# Patient Record
Sex: FEMALE | Race: OTHER | HISPANIC OR LATINO | URBAN - METROPOLITAN AREA
[De-identification: names, ages, dates, MRNs, and addresses within clinical notes are randomized per-mention and may not be internally consistent; named-entity substitution may affect disease eponyms.]

---

## 2020-01-12 ENCOUNTER — EMERGENCY (EMERGENCY)
Facility: HOSPITAL | Age: 55
LOS: 1 days | Discharge: HOME | End: 2020-01-12
Attending: EMERGENCY MEDICINE
Payer: COMMERCIAL

## 2020-01-12 VITALS
TEMPERATURE: 98 F | SYSTOLIC BLOOD PRESSURE: 160 MMHG | WEIGHT: 162.04 LBS | OXYGEN SATURATION: 100 % | HEART RATE: 62 BPM | DIASTOLIC BLOOD PRESSURE: 78 MMHG | RESPIRATION RATE: 16 BRPM

## 2020-01-12 PROCEDURE — 99283 EMERGENCY DEPT VISIT LOW MDM: CPT

## 2020-01-12 RX ORDER — LOSARTAN POTASSIUM 100 MG/1
0 TABLET, FILM COATED ORAL
Qty: 0 | Refills: 0 | DISCHARGE

## 2020-01-13 PROCEDURE — 99283 EMERGENCY DEPT VISIT LOW MDM: CPT

## 2020-01-13 RX ORDER — IBUPROFEN 200 MG
600 TABLET ORAL ONCE
Refills: 0 | Status: COMPLETED | OUTPATIENT
Start: 2020-01-13 | End: 2020-01-13

## 2020-01-13 RX ORDER — METHOCARBAMOL 500 MG/1
1500 TABLET, FILM COATED ORAL ONCE
Refills: 0 | Status: COMPLETED | OUTPATIENT
Start: 2020-01-13 | End: 2020-01-13

## 2020-01-13 RX ORDER — METHOCARBAMOL 500 MG/1
2 TABLET, FILM COATED ORAL
Qty: 40 | Refills: 0
Start: 2020-01-13 | End: 2020-01-17

## 2020-01-13 RX ORDER — IBUPROFEN 200 MG
1 TABLET ORAL
Qty: 20 | Refills: 0
Start: 2020-01-13

## 2020-01-13 RX ADMIN — Medication 600 MILLIGRAM(S): at 00:21

## 2020-01-13 RX ADMIN — METHOCARBAMOL 1500 MILLIGRAM(S): 500 TABLET, FILM COATED ORAL at 00:21

## 2020-01-13 NOTE — ED PROVIDER NOTE - OBJECTIVE STATEMENT
Chief complaint of lower back pain x 2 days after lifting heavy bags. No urinary or bowel incontinence or retention. No motor weakness.

## 2020-01-13 NOTE — ED PROVIDER NOTE - CLINICAL SUMMARY MEDICAL DECISION MAKING FREE TEXT BOX
IBU, Robaxin, warm compresses. Pt has no focal deficits. IBU, Robaxin, warm compresses. Pt has no focal deficits.  1:30a- Pt feels better. Pt is well appearing, has no new complaints and able to walk with normal gait. Pt is stable for discharge and follow up with medical doctor. Pt educated on care and need for follow up. Discussed anticipatory guidance and return precautions. Questions answered. I had a detailed discussion with the patient and/or guardian regarding the historical points, exam findings, and any diagnostic results supporting the discharge diagnosis.

## 2020-01-13 NOTE — ED PROVIDER NOTE - CHPI ED SYMPTOMS NEG
no anorexia/no constipation/no bladder dysfunction/no difficulty bearing weight/no motor function loss/no numbness/no bowel dysfunction/no fatigue/no neck tenderness/no tingling

## 2020-01-13 NOTE — ED PROVIDER NOTE - NSFOLLOWUPINSTRUCTIONS_ED_ALL_ED_FT
Return if you have pain, weakness, numbness any concerns. do not lift heavy weights.   See your doctor as soon as possible (within 1-2 days).   If you need further assistance for appointments you can contact the [facility] Care Coordinator at [phone] or the [facility] Patient Access Services helpline at [phone] to find names/contact #s for a practitioner to follow up with.  Bring your discharge papers / test results with you to any follow up appointments.   In addition our outpatient [facility] is located at [address] [place] [address], tel: [phone].

## 2020-01-13 NOTE — ED PROVIDER NOTE - NSFOLLOWUPCLINICS_GEN_ALL_ED_FT
[facility]  Multi Specialty Office  [address] [facility] - [address] [place] [address]  Phone: [phone]  Fax: [phone]  Follow Up Time:

## 2020-01-13 NOTE — ED PROVIDER NOTE - PATIENT PORTAL LINK FT
You can access the FollowMyHealth Patient Portal offered by [facility] by registering at the following website: [email or web address]. By joining [employer]’s FollowMyHealth portal, you will also be able to view your health information using other applications (apps) compatible with our system.

## 2020-01-13 NOTE — ED PROVIDER NOTE - PHYSICAL EXAMINATION
No cervical, thoracic or lumbosacral midline bony deformities,  +rotation and flexion-extension of neck and truncal area intact.   Back: B/L lumbar paraspinal tenderness and tenseness.   No saddle anesthesia, B/L knee, pedal and EHL flex and ext intact, normal gait, no foot drop. Truncal flexion and extension intact.  No abdominal pain, no pulsatile abdominal mass, b/l femoral pulses intact

## 2021-03-03 ENCOUNTER — EMERGENCY (EMERGENCY)
Facility: HOSPITAL | Age: 56
LOS: 1 days | Discharge: HOME | End: 2021-03-03
Attending: EMERGENCY MEDICINE
Payer: COMMERCIAL

## 2021-03-03 VITALS
TEMPERATURE: 98 F | HEIGHT: 60 IN | OXYGEN SATURATION: 100 % | HEART RATE: 65 BPM | SYSTOLIC BLOOD PRESSURE: 138 MMHG | WEIGHT: 162.92 LBS | RESPIRATION RATE: 18 BRPM | DIASTOLIC BLOOD PRESSURE: 76 MMHG

## 2021-03-03 VITALS
RESPIRATION RATE: 18 BRPM | TEMPERATURE: 98 F | SYSTOLIC BLOOD PRESSURE: 128 MMHG | HEART RATE: 69 BPM | DIASTOLIC BLOOD PRESSURE: 72 MMHG | OXYGEN SATURATION: 100 %

## 2021-03-03 PROBLEM — Z86.79 PERSONAL HISTORY OF OTHER DISEASES OF THE CIRCULATORY SYSTEM: Chronic | Status: ACTIVE | Noted: 2020-01-12

## 2021-03-03 PROCEDURE — 99284 EMERGENCY DEPT VISIT MOD MDM: CPT

## 2021-03-03 PROCEDURE — 96372 THER/PROPH/DIAG INJ SC/IM: CPT

## 2021-03-03 PROCEDURE — 99283 EMERGENCY DEPT VISIT LOW MDM: CPT | Mod: 25

## 2021-03-03 RX ORDER — ACETAMINOPHEN 500 MG
975 TABLET ORAL ONCE
Refills: 0 | Status: COMPLETED | OUTPATIENT
Start: 2021-03-03 | End: 2021-03-03

## 2021-03-03 RX ORDER — DIAZEPAM 5 MG
5 TABLET ORAL ONCE
Refills: 0 | Status: DISCONTINUED | OUTPATIENT
Start: 2021-03-03 | End: 2021-03-03

## 2021-03-03 RX ORDER — DIAZEPAM 5 MG
1 TABLET ORAL
Qty: 8 | Refills: 0
Start: 2021-03-03 | End: 2021-03-05

## 2021-03-03 RX ORDER — KETOROLAC TROMETHAMINE 30 MG/ML
30 SYRINGE (ML) INJECTION ONCE
Refills: 0 | Status: DISCONTINUED | OUTPATIENT
Start: 2021-03-03 | End: 2021-03-03

## 2021-03-03 RX ORDER — LIDOCAINE 4 G/100G
1 CREAM TOPICAL ONCE
Refills: 0 | Status: COMPLETED | OUTPATIENT
Start: 2021-03-03 | End: 2021-03-03

## 2021-03-03 RX ADMIN — LIDOCAINE 1 PATCH: 4 CREAM TOPICAL at 18:08

## 2021-03-03 RX ADMIN — Medication 975 MILLIGRAM(S): at 19:07

## 2021-03-03 RX ADMIN — Medication 975 MILLIGRAM(S): at 18:07

## 2021-03-03 RX ADMIN — Medication 30 MILLIGRAM(S): at 18:00

## 2021-03-03 RX ADMIN — LIDOCAINE 1 PATCH: 4 CREAM TOPICAL at 19:12

## 2021-03-03 RX ADMIN — Medication 30 MILLIGRAM(S): at 17:00

## 2021-03-03 RX ADMIN — Medication 5 MILLIGRAM(S): at 17:00

## 2021-03-03 NOTE — ED PROVIDER NOTE - PROGRESS NOTE DETAILS
Feeling better. Based on history, physical exam, and information available at this time, the overall presentation does not indicate dissection or lytic lesions or cord compress and I do not see an indication that would warrant an emergent CT scan or surgical consultation. At this time, the evidence for any other entities in the differential is insufficient to warrant any further testing or ED observation. This was explained to the patient (and parent if patient is a minor). The patient was advised that persistent or worsening symptoms require further evaluation. Pt is well appearing walking with steady gait, stable for discharge and follow up without fail with medical doctor. I had a detailed discussion with the patient and/or guardian regarding the historical points, exam findings, and any diagnostic results supporting the discharge diagnosis. Pt educated on care and need for follow up. Strict return instructions and red flag signs and symptoms discussed with patient. Questions answered. Pt shows understanding of discharge information and agrees to follow.

## 2021-03-03 NOTE — ED ADULT TRIAGE NOTE - DIRECT TO ROOM CARE INITIATED:
Massage Therapy Progress Note      Length of treatment: 60-minute**Client's 6th massage is free today after 5 previous 60 minute sessions**    Authorization: Patient request    Reviewed contraindications/current health status with Patient    No Contraindications to massage therapy exist    Subjective:  Patient complains of:  Tightness in the head, neck, and shoulders greatest in the Lt. Shoulder. Client's Lt. Shoulder is paricularly sore from kayaking this past weekend. Client is here mostly to relax    Pain report prior to treatment:  Pain relief not a goal of massage    Type of treatment requested: Comfort massage and Therapeutic massage    Specific requests:  Full body     Objective Observations:  Hypertonicity noted in:  Upper trapezius Bilateral, teres minor Left, Gluteus medius bilateral, quad group bilateral., gastrocnemius bilateral, and erectors bilateral    Hypotonicity/Ischemia noted in: None noted    Decreased ROM noted in No areas    Additional observations:  None    Assessment:  Patient received Neuromuscular therapy, Cross-fiber friction, Longitudinal friction, Trigger point therapy, Swedish techniques and Myofascial release to affected tissues.    Additional treatment provided: Ice-cup massage to heel    Response to treatment: Hypertonicity in  noted tissues decreased, Patient's breathing slowed and Patient relaxed easily    Pain report after treatment:N/A    Education/Resources: None provided this visit    Plan/Recommendations:  Increase water consumption  Massage therapy as desired  Session concluded   03-Mar-2021 15:50

## 2021-03-03 NOTE — ED PROVIDER NOTE - PATIENT PORTAL LINK FT
You can access the FollowMyHealth Patient Portal offered by [facility] by registering at the following website: [email or web address]. By joining [employer]’s FollowMyHealth portal, you will also be able to view your health information using other applications (apps) compatible with our system.

## 2021-03-03 NOTE — ED ADULT NURSE NOTE - DRUG PRE-SCREENING (DAST -1)
Statement Selected Total Volume Injected (Ccs- Only Use Numbers And Decimals): 0.7 X Size Of Lesion In Cm (Optional): 0 Include Z78.9 (Other Specified Conditions Influencing Health Status) As An Associated Diagnosis?: No Concentration Of Solution Injected (Mg/Ml): 5.0 Consent: The risks of atrophy were reviewed with the patient. Kenalog Preparation: Kenalog Medical Necessity Clause: This procedure was medically necessary because the lesions that were treated were: Treatment Number (Optional): 1 Detail Level: Simple

## 2021-03-03 NOTE — ED PROVIDER NOTE - OBJECTIVE STATEMENT
Vero : [id number]  Mary Reyes  56 y/o F pt with a significant PMHx of HTN and no significant PSHx presents to the ED with c/o back pain x 6d. Pt states 6d ago, pt was lifting heavy and started to feel left lower back pain. Pt describes the pain as sharp and lower back pain is non-radiating.  Pt states that most times at rest, pt has no pain, but has pain with movement. Pt reports taking Robaxin and Tylenol with no relief. Pt denies any hx of back injury, cancer,  fever, chills, saddle anaesthesia, or any other complains.  cipther disfunction, and reassess. Vero : [id number] (Mayrelis)  56 y/o F pt with a significant PMHx of HTN and no significant PSHx presents to the ED with c/o back pain x 6d. Pt states 6d ago, pt was lifting heavy and started to feel generalized lower back pain. Pt describes the pain as sharp and lower back pain is non-radiating.  Pt states that most times at rest, pt has no pain, but has pain with movement. Pt reports taking Robaxin and Tylenol with no relief. Pt denies any hx of back injury, cancer, fever, chills, saddle anaesthesia, sphincter dysfunction, history of steroid use, or any other complains.

## 2021-03-03 NOTE — ED PROVIDER NOTE - CLINICAL SUMMARY MEDICAL DECISION MAKING FREE TEXT BOX
55 year-old female, presents with cc bilateral lower back pain s/p lifting heavy 6 days ago. Distribution of pain non-radicular, no focal neuro deficits, no systemic symptoms. Will do pain meds, re-assess.

## 2021-03-03 NOTE — ED PROVIDER NOTE - PHYSICAL EXAMINATION
Back is symmetrical, scapulae are symmetric. Neck has full range of motion. S1-S2, paraspinal tenderness, no deformity or step-offs. All limbs equally strong 5+ bilaterally. Strong ankle dorsiflexion and plantar flexion against resistance bilaterally. Strong flexion/extension of big toe bilaterally. Pt is able to walk in straight line with steady gait. No recent weight loss or night sweats. No saddle anesthesia, no bowel/bladder incontinence or retention, no lower extremity weakness.

## 2021-03-03 NOTE — ED PROVIDER NOTE - NSFOLLOWUPINSTRUCTIONS_ED_ALL_ED_FT
Shawn un seguimiento con el médico de atención primaria en 2-3 días.  Puede seguir tomando ibuprofeno (para el dolor, puede nolan 600 mg de ibuprofeno de venta deedee por vía oral cada 6 horas según sea necesario para el dolor. [place] el medicamento con alimentos).  **DETENGA el metocarbamol. En snyder lugar, tome el nuevo medicamento recetado. El medicamento puede causarle somnolencia.  Compresa tibia en la kacy lumbar 3-4 veces al día.  Si experimenta algún síntoma nuevo o que empeora o si está preocupado, siempre puede regresar a la emergencia para elma reevaluación.  Si le dieron alguna receta iza la visita de roxy rodriguezela según lo prescrito. Si tiene alguna pregunta, puede preguntarle al farmacéutico.    * * *    Follow up with the primary care doctor in 2-3 days.  You can continue taking the Ibuprofen ( For pain you can take over the counter Ibuprofen 600 mg orally every 6 hours as needed for pain. Take medication with food. )  STOP the Methocarbamol. Instead take the new prescribed medication. The medication can make you drowsy.  Warm compress to the lower back 3-4 times per day.   If you experience any new or worsening symptoms or if you are concerned you can always come back to the emergency for a re-evaluation.  If there were any prescriptions given to you during the visit today take them as prescribed. If you have any questions you can ask the pharmacist.

## 2024-10-30 ENCOUNTER — EMERGENCY (EMERGENCY)
Facility: HOSPITAL | Age: 59
LOS: 1 days | Discharge: HOME | End: 2024-10-30
Attending: EMERGENCY MEDICINE
Payer: COMMERCIAL

## 2024-10-30 VITALS
SYSTOLIC BLOOD PRESSURE: 179 MMHG | RESPIRATION RATE: 18 BRPM | HEART RATE: 77 BPM | TEMPERATURE: 99 F | HEIGHT: 64 IN | OXYGEN SATURATION: 100 % | DIASTOLIC BLOOD PRESSURE: 83 MMHG

## 2024-10-30 PROCEDURE — 96374 THER/PROPH/DIAG INJ IV PUSH: CPT

## 2024-10-30 PROCEDURE — 72131 CT LUMBAR SPINE W/O DYE: CPT | Mod: 26,MC

## 2024-10-30 PROCEDURE — 99284 EMERGENCY DEPT VISIT MOD MDM: CPT

## 2024-10-30 PROCEDURE — 72192 CT PELVIS W/O DYE: CPT | Mod: MC

## 2024-10-30 PROCEDURE — 96372 THER/PROPH/DIAG INJ SC/IM: CPT | Mod: XU

## 2024-10-30 PROCEDURE — 72131 CT LUMBAR SPINE W/O DYE: CPT | Mod: MC

## 2024-10-30 PROCEDURE — 72192 CT PELVIS W/O DYE: CPT | Mod: 26,MC

## 2024-10-30 PROCEDURE — 99284 EMERGENCY DEPT VISIT MOD MDM: CPT | Mod: 25

## 2024-10-30 RX ORDER — KETOROLAC TROMETHAMINE 10 MG/1
15 TABLET, FILM COATED ORAL ONCE
Refills: 0 | Status: DISCONTINUED | OUTPATIENT
Start: 2024-10-30 | End: 2024-10-30

## 2024-10-30 RX ORDER — LIDOCAINE 50 MG/G
1 CREAM TOPICAL
Qty: 1 | Refills: 0
Start: 2024-10-30 | End: 2024-11-03

## 2024-10-30 RX ORDER — DIAZEPAM 10 MG/1
5 TABLET ORAL ONCE
Refills: 0 | Status: DISCONTINUED | OUTPATIENT
Start: 2024-10-30 | End: 2024-10-30

## 2024-10-30 RX ORDER — OXYCODONE AND ACETAMINOPHEN 5; 325 MG/1; MG/1
1 TABLET ORAL ONCE
Refills: 0 | Status: DISCONTINUED | OUTPATIENT
Start: 2024-10-30 | End: 2024-10-30

## 2024-10-30 RX ORDER — ACETAMINOPHEN 325 MG
2 TABLET ORAL
Qty: 40 | Refills: 0
Start: 2024-10-30 | End: 2024-11-03

## 2024-10-30 RX ORDER — LIDOCAINE 50 MG/G
1 CREAM TOPICAL ONCE
Refills: 0 | Status: COMPLETED | OUTPATIENT
Start: 2024-10-30 | End: 2024-10-30

## 2024-10-30 RX ADMIN — LIDOCAINE 1 PATCH: 50 CREAM TOPICAL at 03:20

## 2024-10-30 RX ADMIN — KETOROLAC TROMETHAMINE 15 MILLIGRAM(S): 10 TABLET, FILM COATED ORAL at 03:20

## 2024-10-30 RX ADMIN — DIAZEPAM 5 MILLIGRAM(S): 10 TABLET ORAL at 03:20

## 2024-10-30 RX ADMIN — Medication 6 MILLIGRAM(S): at 03:20

## 2024-10-30 NOTE — ED PROVIDER NOTE - OBJECTIVE STATEMENT
59-year-old female with history of herniated disc presents with back pain.  Patient states her pain is in her gluteus on her upper thigh which is different she has never had pain there before she denies incontinence weakness numbness paresthesias.  And taking NSAIDs with no relief.

## 2024-10-30 NOTE — ED PROVIDER NOTE - CLINICAL SUMMARY MEDICAL DECISION MAKING FREE TEXT BOX
Patient with back pain presenting differently and given her age will CT to rule out any acute pathology

## 2024-10-30 NOTE — ED PROVIDER NOTE - PATIENT PORTAL LINK FT
You can access the FollowMyHealth Patient Portal offered by [facility] by registering at the following website: [email or web address]. By joining [employer]’s FollowMyHealth portal, you will also be able to view your health information using other applications (apps) compatible with our system.

## 2024-10-30 NOTE — ED PROVIDER NOTE - NSFOLLOWUPINSTRUCTIONS_ED_ALL_ED_FT
Joanna por elegir el [facility] para atenderlo iza snyder momento de necesidad.  Estamos aquí para usted las 24 horas del día, los 7 días de la semana, todos los días del año.      Espero que haya sentido que:    *Lo traté con cortesía y respeto,    *Lo escuché con atención y    * Le expliqué todo de elma manera fácil de entender.    Si no se cumplió alguna de estas expectativas, infórmeme a mí o a snyder enfermera.      Algunos de los medicamentos recetados son necesarios para snyder diagnóstico y deben comenzar a tomarse mañana; otros son para aliviar los síntomas según sea necesario. Asegúrese de comprender las instrucciones antes de irse.    Si usted o un miembro de snyder yelitza no pueden ir a elma farmacia mañana, podemos enviar maximus recetas a [facility] en [address] [facility], que está a lema distancia caminable y está abierto las 24 horas del día, los 7 días de la semana.    Si los costos de los medicamentos son elma preocupación, visite [email or web address] para obtener un cupón.    Por favor, regrese a la vanna de emergencias:    * Mañana si se siente peor,    * En 48 horas si maximus síntomas no cambian, o    * Inmediatamente si siente dolor en el pecho o dificultad para respirar.      Si usted o el miembro de snyder yelitza que lo acompaña necesita elma nota de excusa para ir al trabajo, no dude en pedirla.     Mejórese pronto, Dra. Tammy Duncan MD      Snyder diagnóstico aquí es:  DOLOR DE ESPALDA     Snyder plan de tratamiento es:       Manejo del dolor Según sea necesario:     -> Ibuprofeno/Motrin/Advil 600 mg cada 6 horas SEGÚN SEA NECESARIO SI TIENE DOLOR.   -> Si el dolor continúa a pesar del ibuprofeno, también puede nolan Acetaminofeno/Tylenol 650 mg cada 6 horas.   Estos dos tipos de medicamentos no interactúan, por lo que puede tomarlos juntos.    --> Lidocaine Patch por 12 horas cada cliff  --> Metocarbamol 500 mg cada 8 horas por musculos    Cuidados paliativos:   COMPRA THERMACARE PATCHES - Calor esta lashell por dolor

## 2024-10-30 NOTE — ED PROVIDER NOTE - PHYSICAL EXAMINATION
General: Well appearing,mild distress, appears stated age  HEENT: normocephalic, atraumatic   Respiratory: normal work of breathing  MSK: no swelling or tenderness of lower extremities, moving all extremities spontaneously   Skin: warm, dry  Neuro: A&Ox3, cranial nerves II-XII intact, 5/5 strength in all extremities, no sensory deficits, normal gait   Psych: appropriate affect

## 2025-06-17 NOTE — ED ADULT TRIAGE NOTE - TEMPERATURE IN FAHRENHEIT (DEGREES F)
Chief Complaint   Patient presents with    Ear Problem     History of Present Illness   Tracy Kern is a 58 year old female who presents to me today for ear evaluation. Referred by Dr.Christine Riojas for concerns of bilateral SNHL.     She tells me that she was seen in  and treated for an ear infection in May 2025. Treated with Amoxicillin, which did help. She was experiencing draining and seen again. She was placed on Prednisone, which did not work. She feels like there is something bubbling in her ear.     The patient reports an ear infection since May 10, 2025.   The patient has noticed increased difficulty hearing certain sounds and difficulty in understanding others, especially in noisy or crowded situations.  There is no history of recent head trauma, or chronic ear disease or ear surgery.  The patient reports no exposure to recreational, , and work-related noise exposure.  No family history of hearing loss at a young age. The patient denies vertigo, otorrhea, otalgia.     Feels like something is stuck in her throat.     Past Medical History  Patient Active Problem List   Diagnosis    Morbid obesity (H)    Other malaise and fatigue    Nonspecific reaction to tuberculin skin test without active tuberculosis    Right anterior shoulder pain    Rotator cuff syndrome, right     Current Medications     Current Outpatient Medications:     Acetaminophen (TYLENOL ARTHRITIS PAIN PO), , Disp: , Rfl:     IBUPROFEN PO, , Disp: , Rfl:     omeprazole (PRILOSEC) 20 MG DR capsule, Take 1 capsule (20 mg) by mouth daily., Disp: 90 capsule, Rfl: 0    predniSONE (DELTASONE) 20 MG tablet, Take 3 tabs by mouth daily for ten days then taper to 2 tabs daily for 5 days. ., Disp: 40 tablet, Rfl: 0    Allergies  Allergies   Allergen Reactions    Chloroquine Itching    Levofloxacin Itching       Social History   Social History     Socioeconomic History    Marital status:    Tobacco Use    Smoking status: Never     Passive  exposure: Past    Smokeless tobacco: Never   Vaping Use    Vaping status: Never Used   Substance and Sexual Activity    Alcohol use: No    Drug use: No    Sexual activity: Yes     Partners: Male     Social Drivers of Health     Financial Resource Strain: Low Risk  (5/23/2025)    Financial Resource Strain     Within the past 12 months, have you or your family members you live with been unable to get utilities (heat, electricity) when it was really needed?: No   Food Insecurity: Low Risk  (5/23/2025)    Food Insecurity     Within the past 12 months, did you worry that your food would run out before you got money to buy more?: No     Within the past 12 months, did the food you bought just not last and you didn t have money to get more?: No   Transportation Needs: Low Risk  (5/23/2025)    Transportation Needs     Within the past 12 months, has lack of transportation kept you from medical appointments, getting your medicines, non-medical meetings or appointments, work, or from getting things that you need?: No   Physical Activity: Insufficiently Active (5/23/2025)    Exercise Vital Sign     Days of Exercise per Week: 2 days     Minutes of Exercise per Session: 30 min   Stress: Stress Concern Present (5/23/2025)    [employer] - Occupational Stress Questionnaire     Feeling of Stress : To some extent   Social Connections: Unknown (5/23/2025)    Social Connection and Isolation Panel [NHANES]     Frequency of Social Gatherings with Friends and Family: Patient declined   Interpersonal Safety: Low Risk  (5/23/2025)    Interpersonal Safety     Do you feel physically and emotionally safe where you currently live?: Yes     Within the past 12 months, have you been hit, slapped, kicked or otherwise physically hurt by someone?: No     Within the past 12 months, have you been humiliated or emotionally abused in other ways by your partner or ex-partner?: No   Housing Stability: Low Risk  (5/23/2025)     "Housing Stability     Do you have housing? : Yes     Are you worried about losing your housing?: No       Family History  Family History   Problem Relation Age of Onset    Other - See Comments Mother         Thyroid     No Known Problems Mother     No Known Problems Father     No Known Problems Sister     No Known Problems Daughter     No Known Problems Maternal Grandmother     No Known Problems Maternal Grandfather     No Known Problems Paternal Grandmother     No Known Problems Paternal Grandfather     No Known Problems Maternal Aunt     No Known Problems Paternal Aunt     Hereditary Breast and Ovarian Cancer Syndrome No family hx of     Breast Cancer No family hx of     Cancer No family hx of     Colon Cancer No family hx of     Endometrial Cancer No family hx of     Ovarian Cancer No family hx of        Review of Systems  As per HPI and PMHx, otherwise 10+ comprehensive system review is negative.    Physical Exam  /85   Pulse 80   Ht 1.582 m (5' 2.3\")   Wt 117 kg (258 lb)   SpO2 94%   BMI 46.74 kg/m    GENERAL: Patient is a pleasant, cooperative 58 year old female in no acute distress.  HEAD: Normocephalic, atraumatic.  Hair and scalp are normal.  EYES: Pupils are equal, round, reactive to light and accommodation.  Extraocular movements are intact.  The sclera nonicteric without injection.  The extraocular structures are normal.  EARS: Normal shape and symmetry.  No tenderness when palpating the mastoid or tragal areas bilaterally.  Otoscopic exam reveals no amount of cerumen bilaterally.  The right tympanic membranes are round, intact without evidence of effusion, good landmarks.  No retraction, granulation, or drainage. LEFT - effusion, erythema, myringitis.   NOSE: Nares are patent.  Nasal mucosa is pink.  ORAL CAVITY: Dentition is in good repair.  Mucous membranes are moist.  Tongue is mobile, protrudes to the midline.  Palate elevates symmetrically.  Tonsils are +0.  No erythema or exudate.  No " oral cavity or oropharyngeal masses, lesions, ulcerations, leukoplakia.  NECK: Supple, trachea is midline.  There no palpable cervical lymphadenopathy or masses bilaterally.  Palpation of the bilateral parotid and submandibular areas reveal no masses.  No thyromegaly.    NEUROLOGIC: Cranial nerves II through XII are grossly intact.  Voice is strong.  Patient is House-Brackmann I/VI bilaterally.  CARDIOVASCULAR: Extremities are warm and well-perfused.  No significant peripheral edema.  RESPIRATORY: Patient has nonlabored breathing without cough, wheeze, stridor.  PSYCHIATRIC: Patient is alert and oriented.  Mood and affect appear normal.  SKIN: Warm and dry.  No scalp, face, or neck lesions noted.    Audiogram  The patient underwent an audiogram performed today.  My review of the audiogram shows bilateral SNHL.  Pure-tone average is 26 dB on the right and 60 dB on the left.  Speech reception threshold is 30 dB on the right and 50 dB on the left.  The patient had 96% word recognition on the right and 92% word recognition on the left.  The patient had a AD tympanogram on the right and a B tympanogram on the left.          Assessment and Plan     ICD-10-CM    1. OME (otitis media with effusion), left  H65.92 amoxicillin-clavulanate (AUGMENTIN) 875-125 MG tablet     neomycin-polymyxin-hydrocortisone (CORTISPORIN) 3.5-44399-7 otic solution     neomycin-polymyxin-hydrocortisone (CORTISPORIN) 3.5-82807-8 otic solution     DISCONTINUED: neomycin-polymyxin-hydrocortisone (CORTISPORIN) 3.5-09622-5 otic solution      2. Seasonal allergic rhinitis, unspecified trigger  J30.2 fluticasone (FLONASE) 50 MCG/ACT nasal spray      3. SNHL (sensory-neural hearing loss), asymmetrical  H90.3       4. Myringitis  H73.20         It was my pleasure seeing Tracy Kern today in clinic.  The patient presents with a history of otitis media and eustachian tube dysfunction. She does have evidence of a left otitis media and myringitis. We  discussed starting Augmentin and ear drops to reduce the inflammation on the ear drum. Cortisporin drops were prescribed due to an allergy to Levaquin.     We discussed future prevention of eustachian tube dysfunction and allergies, considering both can cause inflammation and fluid build up. We discussed starting a saline nasal rinse to help get any mucus stuck in the sinuses out, use Flonase twice daily, and Zyrtec daily. Would recommend this regimen for 3 months. If symptoms persist, then could consider a myringotomy in clinic.     We will follow up in 3-4 weeks to recheck the ear infection and discuss throat concerns with a laryngeal scope.     KASSI Mejia CNP  Otolaryngology  [facility]     97.9